# Patient Record
Sex: FEMALE | Race: WHITE | NOT HISPANIC OR LATINO
[De-identification: names, ages, dates, MRNs, and addresses within clinical notes are randomized per-mention and may not be internally consistent; named-entity substitution may affect disease eponyms.]

---

## 2020-09-29 PROBLEM — Z00.00 ENCOUNTER FOR PREVENTIVE HEALTH EXAMINATION: Status: ACTIVE | Noted: 2020-09-29

## 2020-10-16 ENCOUNTER — APPOINTMENT (OUTPATIENT)
Dept: OBGYN | Facility: CLINIC | Age: 70
End: 2020-10-16
Payer: COMMERCIAL

## 2020-10-16 VITALS — SYSTOLIC BLOOD PRESSURE: 140 MMHG | DIASTOLIC BLOOD PRESSURE: 100 MMHG | HEIGHT: 62 IN

## 2020-10-16 DIAGNOSIS — Z83.3 FAMILY HISTORY OF DIABETES MELLITUS: ICD-10-CM

## 2020-10-16 DIAGNOSIS — Z86.79 PERSONAL HISTORY OF OTHER DISEASES OF THE CIRCULATORY SYSTEM: ICD-10-CM

## 2020-10-16 DIAGNOSIS — Z82.62 FAMILY HISTORY OF OSTEOPOROSIS: ICD-10-CM

## 2020-10-16 DIAGNOSIS — Z78.0 ASYMPTOMATIC MENOPAUSAL STATE: ICD-10-CM

## 2020-10-16 DIAGNOSIS — B36.9 SUPERFICIAL MYCOSIS, UNSPECIFIED: ICD-10-CM

## 2020-10-16 DIAGNOSIS — Z82.49 FAMILY HISTORY OF ISCHEMIC HEART DISEASE AND OTHER DISEASES OF THE CIRCULATORY SYSTEM: ICD-10-CM

## 2020-10-16 LAB
BILIRUB UR QL STRIP: NORMAL
GLUCOSE UR-MCNC: NORMAL
HCG UR QL: 0.2 EU/DL
HGB UR QL STRIP.AUTO: NORMAL
KETONES UR-MCNC: NORMAL
LEUKOCYTE ESTERASE UR QL STRIP: NORMAL
NITRITE UR QL STRIP: NORMAL
PH UR STRIP: 6
PROT UR STRIP-MCNC: NORMAL
SP GR UR STRIP: 1.01

## 2020-10-16 PROCEDURE — G0101: CPT

## 2020-10-16 PROCEDURE — 81002 URINALYSIS NONAUTO W/O SCOPE: CPT

## 2020-10-16 RX ORDER — NYSTATIN AND TRIAMCINOLONE ACETONIDE 100000; 1 MG/G; MG/G
100000-0.1 CREAM TOPICAL 3 TIMES DAILY
Qty: 1 | Refills: 1 | Status: ACTIVE | COMMUNITY
Start: 2020-10-16 | End: 1900-01-01

## 2020-10-16 RX ORDER — LOSARTAN POTASSIUM 100 MG/1
100 TABLET, FILM COATED ORAL
Refills: 0 | Status: ACTIVE | COMMUNITY

## 2020-10-16 NOTE — DISCUSSION/SUMMARY
[FreeTextEntry1] : Pap/HPV done\par Mammogram/ breast u/s Rx given\par Rx given for rash under breasts\par Breast self exam reinforced\par Annual labs done with PCP\par Colonoscopy scheduled\par Diet and exercise reviewed- getting knee treatment\par Stress mgmt strategies reviewed\par Supplements: multivitamin, fish oil, Vit D\par RTO 1yr or prn\par

## 2020-10-16 NOTE — REVIEW OF SYSTEMS
[Skin Lesion on Breast] : skin lesion on breast [Genital Rash/Irritation] : genital rash/irritation [Negative] : Psychiatric

## 2020-10-16 NOTE — HISTORY OF PRESENT ILLNESS
[Patient reported mammogram was normal] : Patient reported mammogram was normal [Patient reported PAP Smear was normal] : Patient reported PAP Smear was normal [postmenopausal] : postmenopausal [Y] : Positive pregnancy history [TextBox_4] : . [TextBox_19] : First mammo 20yrs ago [Mammogramdate] : 01/00 [PapSmeardate] : 11/15 [TextBox_43] : Has her first one next month [facility] [PGHxTotal] : 3 [PGHxAbortions] : 1 [[facility]] : 3 [FreeTextEntry1] : H [PGHxMultBirths] : 1

## 2020-10-16 NOTE — PHYSICAL EXAM
[Appropriately responsive] : appropriately responsive [Alert] : alert [No Acute Distress] : no acute distress [No Lymphadenopathy] : no lymphadenopathy [Regular Rate Rhythm] : regular rate rhythm [No Murmurs] : no murmurs [Clear to Auscultation B/L] : clear to auscultation bilaterally [Soft] : soft [Non-tender] : non-tender [Non-distended] : non-distended [No Lesions] : no lesions [No HSM] : No HSM [No Mass] : no mass [Oriented x3] : oriented x3 [Examination Of The Breasts] : a normal appearance [Labia Majora] : normal [Labia Minora] : normal [Normal] : normal [FreeTextEntry6] : unable to palpate due to habitus [FreeTextEntry2] : carbuncle on right labia

## 2020-10-18 LAB — HPV HIGH+LOW RISK DNA PNL CVX: NOT DETECTED

## 2020-10-21 LAB — CYTOLOGY CVX/VAG DOC THIN PREP: NORMAL

## 2022-09-12 ENCOUNTER — APPOINTMENT (OUTPATIENT)
Dept: OBGYN | Facility: CLINIC | Age: 72
End: 2022-09-12

## 2023-01-26 ENCOUNTER — OFFICE (OUTPATIENT)
Dept: URBAN - METROPOLITAN AREA CLINIC 29 | Facility: CLINIC | Age: 73
Setting detail: OPHTHALMOLOGY
End: 2023-01-26
Payer: MEDICARE

## 2023-01-26 DIAGNOSIS — H40.013: ICD-10-CM

## 2023-01-26 DIAGNOSIS — H18.519: ICD-10-CM

## 2023-01-26 DIAGNOSIS — H25.13: ICD-10-CM

## 2023-01-26 PROCEDURE — 92014 COMPRE OPH EXAM EST PT 1/>: CPT | Performed by: OPTOMETRIST

## 2023-01-26 PROCEDURE — 92133 CPTRZD OPH DX IMG PST SGM ON: CPT | Performed by: OPTOMETRIST

## 2023-01-26 ASSESSMENT — REFRACTION_MANIFEST
OS_AXIS: 100
OD_AXIS: 090
OS_CYLINDER: +2.00
OD_AXIS: 090
OD_VA1: 20/30
OS_ADD: +2.25
OD_SPHERE: -4.00
OS_SPHERE: -4.00
OS_ADD: +2.75
OD_ADD: +2.75
OD_CYLINDER: +2.00
OD_VA2: 20/20
OS_CYLINDER: +2.00
OD_ADD: +2.25
OS_AXIS: 100
OS_VA2: 20/30
OD_VA1: 20/20
OU_VA: 20/20
OD_VA2: 20/30
OS_VA2: 20/20
OU_VA: 20/20
OD_CYLINDER: +2.00
OS_VA1: 20/30
OS_VA1: 20/20
OS_SPHERE: -4.00
OD_SPHERE: -4.00

## 2023-01-26 ASSESSMENT — REFRACTION_CURRENTRX
OS_OVR_VA: 20/
OD_AXIS: 090
OD_CYLINDER: +2.00
OS_AXIS: 100
OS_CYLINDER: +2.00
OD_SPHERE: -4.25
OD_OVR_VA: 20/
OS_ADD: +2.00
OS_SPHERE: -4.50
OD_ADD: +2.00

## 2023-01-26 ASSESSMENT — REFRACTION_AUTOREFRACTION
OD_CYLINDER: +3.00
OS_AXIS: 90
OS_CYLINDER: +3.00
OD_AXIS: 90
OS_SPHERE: -6.00
OD_SPHERE: -4.25

## 2023-01-26 ASSESSMENT — VISUAL ACUITY
OD_BCVA: 20/100-2
OS_BCVA: 20/30-2

## 2023-01-26 ASSESSMENT — SPHEQUIV_DERIVED
OD_SPHEQUIV: -3
OD_SPHEQUIV: -3
OD_SPHEQUIV: -2.75
OS_SPHEQUIV: -4.5
OS_SPHEQUIV: -3
OS_SPHEQUIV: -3

## 2023-01-26 ASSESSMENT — CONFRONTATIONAL VISUAL FIELD TEST (CVF)
OD_FINDINGS: FULL
OS_FINDINGS: FULL

## 2023-07-05 ENCOUNTER — OFFICE (OUTPATIENT)
Dept: URBAN - METROPOLITAN AREA CLINIC 29 | Facility: CLINIC | Age: 73
Setting detail: OPHTHALMOLOGY
End: 2023-07-05
Payer: MEDICARE

## 2023-07-05 DIAGNOSIS — H10.9: ICD-10-CM

## 2023-07-05 PROCEDURE — 99213 OFFICE O/P EST LOW 20 MIN: CPT | Performed by: OPTOMETRIST

## 2023-07-05 ASSESSMENT — REFRACTION_MANIFEST
OS_VA2: 20/20
OD_CYLINDER: +2.00
OU_VA: 20/20
OS_AXIS: 100
OS_CYLINDER: +2.00
OD_SPHERE: -4.00
OD_VA1: 20/30
OS_SPHERE: -4.00
OS_CYLINDER: +2.00
OS_VA2: 20/30
OD_VA2: 20/30
OS_ADD: +2.25
OD_ADD: +2.25
OS_AXIS: 100
OD_AXIS: 090
OS_VA1: 20/30
OD_ADD: +2.75
OD_VA2: 20/20
OD_SPHERE: -4.00
OS_ADD: +2.75
OS_SPHERE: -4.00
OD_VA1: 20/20
OD_CYLINDER: +2.00
OU_VA: 20/20
OS_VA1: 20/20
OD_AXIS: 090

## 2023-07-05 ASSESSMENT — SPHEQUIV_DERIVED
OD_SPHEQUIV: -3
OS_SPHEQUIV: -3
OS_SPHEQUIV: -4.5
OS_SPHEQUIV: -3
OD_SPHEQUIV: -2.75
OD_SPHEQUIV: -3

## 2023-07-05 ASSESSMENT — REFRACTION_CURRENTRX
OS_ADD: +2.00
OS_OVR_VA: 20/
OD_AXIS: 090
OD_CYLINDER: +2.00
OD_OVR_VA: 20/
OS_CYLINDER: +2.00
OS_SPHERE: -4.50
OD_SPHERE: -4.25
OS_AXIS: 100
OD_ADD: +2.00

## 2023-07-05 ASSESSMENT — CONFRONTATIONAL VISUAL FIELD TEST (CVF)
OS_FINDINGS: FULL
OD_FINDINGS: FULL

## 2023-07-05 ASSESSMENT — REFRACTION_AUTOREFRACTION
OS_AXIS: 90
OD_AXIS: 90
OD_SPHERE: -4.25
OS_SPHERE: -6.00
OD_CYLINDER: +3.00
OS_CYLINDER: +3.00

## 2023-07-05 ASSESSMENT — VISUAL ACUITY
OS_BCVA: 20/30-1
OD_BCVA: 20/40

## 2024-03-06 ENCOUNTER — OFFICE (OUTPATIENT)
Dept: URBAN - METROPOLITAN AREA CLINIC 29 | Facility: CLINIC | Age: 74
Setting detail: OPHTHALMOLOGY
End: 2024-03-06
Payer: MEDICARE

## 2024-03-06 DIAGNOSIS — H10.9: ICD-10-CM

## 2024-03-06 DIAGNOSIS — H25.13: ICD-10-CM

## 2024-03-06 DIAGNOSIS — H18.519: ICD-10-CM

## 2024-03-06 DIAGNOSIS — H40.013: ICD-10-CM

## 2024-03-06 PROCEDURE — 92014 COMPRE OPH EXAM EST PT 1/>: CPT | Performed by: OPTOMETRIST

## 2024-03-06 PROCEDURE — 92133 CPTRZD OPH DX IMG PST SGM ON: CPT | Performed by: OPTOMETRIST

## 2024-03-06 ASSESSMENT — REFRACTION_CURRENTRX
OS_SPHERE: -4.25
OS_ADD: +3.50
OS_OVR_VA: 20/
OD_OVR_VA: 20/
OD_SPHERE: -4.25
OD_CYLINDER: +2.25
OD_ADD: +3.50
OS_AXIS: 100
OS_CYLINDER: +2.25
OD_AXIS: 88

## 2024-03-06 ASSESSMENT — REFRACTION_MANIFEST
OD_CYLINDER: +2.00
OS_CYLINDER: +1.50
OS_AXIS: 100
OS_VA1: 20/30
OS_VA2: 20/40
OD_AXIS: 090
OD_ADD: +2.75
OU_VA: 20/20
OS_ADD: +2.75
OS_VA1: 20/20
OD_SPHERE: -4.00
OU_VA: 20/20
OD_VA2: 20/30
OS_ADD: +2.25
OD_CYLINDER: +2.00
OD_CYLINDER: +2.00
OD_AXIS: 090
OD_SPHERE: -4.00
OD_AXIS: 090
OS_SPHERE: -4.00
OD_SPHERE: -4.00
OS_SPHERE: -4.00
OS_VA1: 20/40
OD_VA1: 20/25
OU_VA: 20/20
OS_AXIS: 100
OD_VA2: 20/30
OS_CYLINDER: +2.00
OS_VA2: 20/20
OD_VA1: 20/20
OS_ADD: +2.75
OS_SPHERE: -4.50
OD_ADD: +2.75
OS_VA2: 20/30
OS_CYLINDER: +2.00
OD_VA2: 20/20
OD_ADD: +2.25
OS_AXIS: 100
OD_VA1: 20/30

## 2024-03-06 ASSESSMENT — SPHEQUIV_DERIVED
OD_SPHEQUIV: -3
OS_SPHEQUIV: -3
OS_SPHEQUIV: -3.75
OS_SPHEQUIV: -3

## 2024-05-21 DIAGNOSIS — Z01.419 ENCOUNTER FOR GYNECOLOGICAL EXAMINATION (GENERAL) (ROUTINE) W/OUT ABNORMAL FINDINGS: ICD-10-CM

## 2024-06-03 ENCOUNTER — APPOINTMENT (OUTPATIENT)
Dept: OBGYN | Facility: CLINIC | Age: 74
End: 2024-06-03

## 2024-10-10 DIAGNOSIS — Z12.39 ENCOUNTER FOR OTHER SCREENING FOR MALIGNANT NEOPLASM OF BREAST: ICD-10-CM

## 2024-10-22 ENCOUNTER — APPOINTMENT (OUTPATIENT)
Dept: OBGYN | Facility: CLINIC | Age: 74
End: 2024-10-22

## 2025-06-03 ENCOUNTER — APPOINTMENT (OUTPATIENT)
Dept: BARIATRICS | Facility: CLINIC | Age: 75
End: 2025-06-03

## 2025-07-24 ENCOUNTER — OFFICE (OUTPATIENT)
Dept: URBAN - METROPOLITAN AREA CLINIC 29 | Facility: CLINIC | Age: 75
Setting detail: OPHTHALMOLOGY
End: 2025-07-24
Payer: MEDICARE

## 2025-07-24 DIAGNOSIS — H40.013: ICD-10-CM

## 2025-07-24 DIAGNOSIS — H25.13: ICD-10-CM

## 2025-07-24 DIAGNOSIS — H18.519: ICD-10-CM

## 2025-07-24 PROBLEM — D31.32 CHOROIDAL NEVUS, LEFT EYE: Status: ACTIVE | Noted: 2025-07-24

## 2025-07-24 PROCEDURE — 92014 COMPRE OPH EXAM EST PT 1/>: CPT | Performed by: OPTOMETRIST

## 2025-07-24 PROCEDURE — 92133 CPTRZD OPH DX IMG PST SGM ON: CPT | Performed by: OPTOMETRIST

## 2025-07-24 ASSESSMENT — REFRACTION_MANIFEST
OS_ADD: +2.75
OD_VA2: 20/30
OS_ADD: +2.25
OS_VA1: 20/50-2
OD_VA1: 20/25
OD_AXIS: 090
OD_VA1: 20/20
OD_AXIS: 090
OS_AXIS: 100
OD_ADD: +2.75
OD_CYLINDER: +2.00
OD_CYLINDER: +2.00
OS_VA1: 20/40
OD_VA1: 20/25-2
OD_SPHERE: -4.00
OD_AXIS: 090
OS_VA2: 20/40
OD_VA1: 20/30
OS_SPHERE: -4.50
OD_VA2: 20/20
OU_VA: 20/20
OS_VA2: 20/30
OS_CYLINDER: +2.00
OS_ADD: +2.75
OD_ADD: +2.75
OS_AXIS: 100
OS_CYLINDER: +2.00
OS_VA1: 20/20
OS_SPHERE: -4.00
OS_SPHERE: -4.50
OD_VA2: 20/30
OD_SPHERE: -4.00
OS_VA1: 20/30
OS_VA2: 20/40
OS_CYLINDER: +1.50
OS_AXIS: 100
OD_SPHERE: -4.00
OD_ADD: +2.25
OU_VA: 20/20
OS_CYLINDER: +1.50
OD_CYLINDER: +2.00
OD_CYLINDER: +2.00
OS_SPHERE: -4.00
OD_SPHERE: -4.00
OS_AXIS: 100
OD_AXIS: 090
OD_VA2: 20/30
OD_ADD: +2.75
OU_VA: 20/20
OS_ADD: +2.75
OS_VA2: 20/20
OU_VA: 20/20

## 2025-07-24 ASSESSMENT — REFRACTION_CURRENTRX
OS_CYLINDER: +2.50
OS_OVR_VA: 20/
OS_OVR_VA: 20/
OS_SPHERE: -4.25
OS_CYLINDER: +2.25
OD_SPHERE: -4.25
OS_AXIS: 100
OD_AXIS: 88
OS_ADD: +3.50
OD_CYLINDER: +2.25
OS_VPRISM_DIRECTION: PROGS
OD_VPRISM_DIRECTION: PROGS
OD_ADD: +3.50
OD_ADD: +3.50
OD_CYLINDER: +2.25
OD_SPHERE: -4.25
OD_AXIS: 088
OD_OVR_VA: 20/
OS_ADD: +3.50
OD_OVR_VA: 20/
OS_AXIS: 099
OS_SPHERE: -4.50

## 2025-07-24 ASSESSMENT — VISUAL ACUITY
OS_BCVA: 20/25+1
OD_BCVA: 20/40-2

## 2025-07-24 ASSESSMENT — REFRACTION_AUTOREFRACTION
OD_AXIS: 095
OS_CYLINDER: +3.75
OS_SPHERE: -7.75
OS_AXIS: 097
OD_SPHERE: -4.00
OD_CYLINDER: +2.00

## 2025-07-24 ASSESSMENT — CONFRONTATIONAL VISUAL FIELD TEST (CVF)
OD_FINDINGS: FULL
OS_FINDINGS: FULL